# Patient Record
Sex: MALE | Race: BLACK OR AFRICAN AMERICAN | Employment: FULL TIME | URBAN - METROPOLITAN AREA
[De-identification: names, ages, dates, MRNs, and addresses within clinical notes are randomized per-mention and may not be internally consistent; named-entity substitution may affect disease eponyms.]

---

## 2018-05-24 ENCOUNTER — HOSPITAL ENCOUNTER (EMERGENCY)
Age: 28
Discharge: HOME | End: 2018-05-24
Attending: EMERGENCY MEDICINE

## 2018-05-24 VITALS
HEART RATE: 94 BPM | WEIGHT: 165 LBS | DIASTOLIC BLOOD PRESSURE: 76 MMHG | OXYGEN SATURATION: 97 % | TEMPERATURE: 98.2 F | RESPIRATION RATE: 16 BRPM | SYSTOLIC BLOOD PRESSURE: 115 MMHG | HEIGHT: 69 IN | BODY MASS INDEX: 24.44 KG/M2

## 2018-05-24 DIAGNOSIS — F10.920 ACUTE ALCOHOLIC INTOXICATION WITHOUT COMPLICATION (HCC): Primary | ICD-10-CM

## 2018-05-24 PROCEDURE — 99284 EMERGENCY DEPT VISIT MOD MDM: CPT

## 2021-05-29 ENCOUNTER — HOSPITAL ENCOUNTER (EMERGENCY)
Age: 31
Discharge: HOME | End: 2021-05-29
Attending: FAMILY MEDICINE

## 2021-05-29 VITALS
TEMPERATURE: 96.5 F | BODY MASS INDEX: 21.76 KG/M2 | DIASTOLIC BLOOD PRESSURE: 87 MMHG | HEIGHT: 75 IN | HEART RATE: 92 BPM | WEIGHT: 175 LBS | RESPIRATION RATE: 16 BRPM | OXYGEN SATURATION: 99 % | SYSTOLIC BLOOD PRESSURE: 132 MMHG

## 2021-05-29 DIAGNOSIS — J06.9 ACUTE UPPER RESPIRATORY INFECTION: Primary | ICD-10-CM

## 2021-05-29 LAB — SARS-COV-2, NAAT: NOT DETECTED

## 2021-05-29 PROCEDURE — 99283 EMERGENCY DEPT VISIT LOW MDM: CPT

## 2021-05-29 PROCEDURE — 87635 SARS-COV-2 COVID-19 AMP PRB: CPT

## 2021-05-29 RX ORDER — BROMPHENIRAMINE MALEATE, PSEUDOEPHEDRINE HYDROCHLORIDE, AND DEXTROMETHORPHAN HYDROBROMIDE 2; 30; 10 MG/5ML; MG/5ML; MG/5ML
5 SYRUP ORAL 4 TIMES DAILY PRN
Qty: 90 ML | Refills: 0 | Status: SHIPPED | OUTPATIENT
Start: 2021-05-29

## 2021-05-29 NOTE — ED PROVIDER NOTES
HPI:  5/29/21,   Time: 10:24 AM EDT         Coleman Perez is a 28 y.o. male presenting to the ED for a 3-day history of upper respiratory symptoms that include clear nasal discharge, postnasal drainage, nonproductive cough, achy muscles and feeling tired. He denies loss of taste or smell. He has been exposed to sick contacts at home, his children have been ill. ROS:   Pertinent positives and negatives are stated within HPI, all other systems reviewed and are negative.  --------------------------------------------- PAST HISTORY ---------------------------------------------  Past Medical History:  has no past medical history on file. Past Surgical History:  has no past surgical history on file. Social History:  reports that he has been smoking cigars. He has smoked for the past 5.00 years. He has never used smokeless tobacco. He reports current alcohol use. He reports current drug use. Drug: Marijuana. Family History: family history is not on file. The patients home medications have been reviewed. Allergies: Patient has no known allergies. -------------------------------------------------- RESULTS -------------------------------------------------  All laboratory and radiology results have been personally reviewed by myself   LABS:  No results found for this visit on 05/29/21. RADIOLOGY:  Interpreted by Radiologist.  No orders to display       ------------------------- NURSING NOTES AND VITALS REVIEWED ---------------------------   The nursing notes within the ED encounter and vital signs as below have been reviewed.    /87   Pulse 92   Temp 96.5 °F (35.8 °C) (Infrared)   Resp 16   Ht 6' 3\" (1.905 m)   Wt 175 lb (79.4 kg)   SpO2 99%   BMI 21.87 kg/m²   Oxygen Saturation Interpretation: Normal      ---------------------------------------------------PHYSICAL EXAM--------------------------------------    Constitutional/General: Alert and oriented x3, well appearing, non toxic in NAD  Head: NC/AT  Eyes: PERRL, EOMI  Mouth: Oropharynx clear, handling secretions, no trismus  Neck: Supple, full ROM, no meningeal signs  Pulmonary: Lungs clear to auscultation bilaterally, no wheezes, rales, or rhonchi. Not in respiratory distress  Cardiovascular:  Regular rate and rhythm, no murmurs, gallops, or rubs. 2+ distal pulses  Abdomen: Soft, non tender, non distended,   Extremities: Moves all extremities x 4. Warm and well perfused  Skin: warm and dry without rash  Neurologic: GCS 15,  Psych: Normal Affect      ------------------------------ ED COURSE/MEDICAL DECISION MAKING----------------------  Medications - No data to display      Medical Decision Making:    Simple    Counseling: The emergency provider has spoken with the patient and discussed todays results, in addition to providing specific details for the plan of care and counseling regarding the diagnosis and prognosis. Questions are answered at this time and they are agreeable with the plan.      --------------------------------- IMPRESSION AND DISPOSITION ---------------------------------    IMPRESSION  1.  Acute upper respiratory infection        DISPOSITION  Disposition: Discharge to home  Patient condition is stable                 Emerald Wilkes MD  05/29/21 [id number]

## 2021-05-29 NOTE — LETTER
[address] [facility] Emergency Department  [id number] [facility] [address]  Phone: [phone]               May 29, 2021    Patient: Willy Ko   YOB: 1990   Date of Visit: 5/29/2021       To Whom It May Concern:    Antonio Zamora was seen and treated in our emergency department on 5/29/2021. He may return to work on 05/31/2021.       Sincerely,       Cici Chauhan MD         Signature:__________________________________

## 2022-07-25 LAB
MEASLES IMMUNE (IGG): NORMAL
MUMPS AB IGG: NORMAL
RUBELLA ANTIBODY IGG: NORMAL
VARICELLA-ZOSTER VIRUS AB, IGG: NORMAL

## 2023-10-01 ENCOUNTER — HOSPITAL ENCOUNTER (EMERGENCY)
Age: 33
Discharge: HOME | End: 2023-10-02
Payer: COMMERCIAL

## 2023-10-01 DIAGNOSIS — S61.011A LACERATION OF RIGHT THUMB WITHOUT FOREIGN BODY WITHOUT DAMAGE TO NAIL, INITIAL ENCOUNTER: Primary | ICD-10-CM

## 2023-10-01 PROCEDURE — 99283 EMERGENCY DEPT VISIT LOW MDM: CPT

## 2023-10-01 PROCEDURE — 2500000003 HC RX 250 WO HCPCS: Performed by: NURSE PRACTITIONER

## 2023-10-01 PROCEDURE — 6370000000 HC RX 637 (ALT 250 FOR IP): Performed by: NURSE PRACTITIONER

## 2023-10-01 RX ORDER — BACITRACIN ZINC 500 [USP'U]/G
OINTMENT TOPICAL ONCE
Status: COMPLETED | OUTPATIENT
Start: 2023-10-01 | End: 2023-10-01

## 2023-10-01 RX ORDER — LIDOCAINE HYDROCHLORIDE 10 MG/ML
5 INJECTION, SOLUTION INFILTRATION; PERINEURAL ONCE
Status: COMPLETED | OUTPATIENT
Start: 2023-10-01 | End: 2023-10-01

## 2023-10-01 RX ADMIN — BACITRACIN ZINC: 500 OINTMENT TOPICAL at 23:23

## 2023-10-01 RX ADMIN — LIDOCAINE HYDROCHLORIDE 5 ML: 10 INJECTION, SOLUTION INFILTRATION; PERINEURAL at 23:24

## 2023-10-01 ASSESSMENT — LIFESTYLE VARIABLES
HOW MANY STANDARD DRINKS CONTAINING ALCOHOL DO YOU HAVE ON A TYPICAL DAY: 5 OR 6
HOW OFTEN DO YOU HAVE A DRINK CONTAINING ALCOHOL: 2-3 TIMES A WEEK

## 2023-10-01 ASSESSMENT — PAIN SCALES - GENERAL: PAINLEVEL_OUTOF10: 6

## 2023-10-02 ENCOUNTER — APPOINTMENT (OUTPATIENT)
Dept: GENERAL RADIOLOGY | Age: 33
End: 2023-10-02
Payer: COMMERCIAL

## 2023-10-02 VITALS
OXYGEN SATURATION: 98 % | TEMPERATURE: 97.3 F | RESPIRATION RATE: 16 BRPM | DIASTOLIC BLOOD PRESSURE: 92 MMHG | SYSTOLIC BLOOD PRESSURE: 134 MMHG | HEART RATE: 89 BPM

## 2023-10-02 PROCEDURE — 73140 X-RAY EXAM OF FINGER(S): CPT

## 2023-10-02 RX ORDER — CEPHALEXIN 500 MG/1
500 CAPSULE ORAL 4 TIMES DAILY
Qty: 28 CAPSULE | Refills: 0 | Status: SHIPPED | OUTPATIENT
Start: 2023-10-02 | End: 2023-10-09

## 2023-10-02 NOTE — DISCHARGE INSTRUCTIONS
Keep current dressing in place for the next 48 hours. On Tuesday you can change it. If the dressing is sticking just place it under warm water. You can then start dressing changes with bacitracin ointment. Take the antibiotic as prescribed. Take Motrin or Tylenol for additional pain relief. Follow-up with your primary care doctor within 1 week for wound recheck.

## 2025-02-04 ENCOUNTER — HOSPITAL ENCOUNTER (EMERGENCY)
Age: 35
Discharge: HOME | End: 2025-02-04
Payer: COMMERCIAL

## 2025-02-04 VITALS
BODY MASS INDEX: 24.87 KG/M2 | RESPIRATION RATE: 16 BRPM | DIASTOLIC BLOOD PRESSURE: 91 MMHG | OXYGEN SATURATION: 96 % | HEART RATE: 115 BPM | HEIGHT: 75 IN | TEMPERATURE: 99.2 F | WEIGHT: 200 LBS | SYSTOLIC BLOOD PRESSURE: 134 MMHG

## 2025-02-04 DIAGNOSIS — J10.1 INFLUENZA A: Primary | ICD-10-CM

## 2025-02-04 DIAGNOSIS — J06.9 VIRAL URI WITH COUGH: ICD-10-CM

## 2025-02-04 DIAGNOSIS — R52 BODY ACHES: ICD-10-CM

## 2025-02-04 LAB
INFLUENZA A BY PCR: DETECTED
INFLUENZA B BY PCR: NOT DETECTED
SARS-COV-2 RDRP RESP QL NAA+PROBE: NOT DETECTED
SPECIMEN DESCRIPTION: NORMAL

## 2025-02-04 PROCEDURE — 87635 SARS-COV-2 COVID-19 AMP PRB: CPT

## 2025-02-04 PROCEDURE — 99283 EMERGENCY DEPT VISIT LOW MDM: CPT

## 2025-02-04 PROCEDURE — 87502 INFLUENZA DNA AMP PROBE: CPT

## 2025-02-04 PROCEDURE — 6370000000 HC RX 637 (ALT 250 FOR IP): Performed by: PHYSICIAN ASSISTANT

## 2025-02-04 RX ORDER — ACETAMINOPHEN 325 MG/1
650 TABLET ORAL ONCE
Status: COMPLETED | OUTPATIENT
Start: 2025-02-04 | End: 2025-02-04

## 2025-02-04 RX ORDER — ACETAMINOPHEN 500 MG
500 TABLET ORAL EVERY 6 HOURS PRN
Qty: 30 TABLET | Refills: 0 | Status: SHIPPED | OUTPATIENT
Start: 2025-02-04

## 2025-02-04 RX ORDER — IBUPROFEN 800 MG/1
800 TABLET, FILM COATED ORAL ONCE
Status: COMPLETED | OUTPATIENT
Start: 2025-02-04 | End: 2025-02-04

## 2025-02-04 RX ORDER — BROMPHENIRAMINE MALEATE, PSEUDOEPHEDRINE HYDROCHLORIDE, AND DEXTROMETHORPHAN HYDROBROMIDE 2; 30; 10 MG/5ML; MG/5ML; MG/5ML
5 SYRUP ORAL 3 TIMES DAILY PRN
Qty: 250 ML | Refills: 0 | Status: SHIPPED | OUTPATIENT
Start: 2025-02-04

## 2025-02-04 RX ADMIN — ACETAMINOPHEN 650 MG: 325 TABLET ORAL at 13:34

## 2025-02-04 RX ADMIN — IBUPROFEN 800 MG: 800 TABLET, FILM COATED ORAL at 13:33

## 2025-02-04 ASSESSMENT — LIFESTYLE VARIABLES
HOW OFTEN DO YOU HAVE A DRINK CONTAINING ALCOHOL: NEVER
HOW MANY STANDARD DRINKS CONTAINING ALCOHOL DO YOU HAVE ON A TYPICAL DAY: PATIENT DOES NOT DRINK

## 2025-02-04 NOTE — ED PROVIDER NOTES
Independent [facility] Visit.     Department of Emergency Medicine   ED  Provider Note  Admit Date/RoomTime: 2/4/2025  1:41 PM  ED Room: WAITING RESULTS/WAITING *    Chief Complaint:   Cough (For the past two days), Chills, and Generalized Body Aches    History of Present Illness      Antonio Zamora is a 34 y.o. old male who presents to the ED for cough, congestion, fever, and bodyaches that has been ongoing for the past couple days.  Patient denies any abdominal pain, nausea, vomiting, or diarrhea.  He has no chest pain or shortness of breath.  Patient does not have a history of asthma.  He has not tried any over-the-counter medication.  He is accompanied by his daughter who is also being seen for the same complaints.  Patient is alert and oriented x 3 and in no apparent distress at this exam.  He is nontoxic-appearing.    PCP: No primary care provider on file.    ROS   Pertinent positives and negatives are stated within HPI, all other systems reviewed and are negative.    Past Medical History:  has no past medical history on file.    Past Surgical History:  has no past surgical history on file.    Social History:  reports that he has been smoking cigars. He has never used smokeless tobacco. He reports current alcohol use. He reports current drug use. Drug: Marijuana (Weed).    Family History: family history is not on file.     The patient’s home medications have been reviewed.    Allergies: Patient has no known allergies.  Allergies have been reviewed with patient.     Physical Exam   VS:  BP (!) 134/91   Pulse (!) 115   Temp 99.2 °F (37.3 °C) (Temporal)   Resp 16   Ht 1.905 m (6' 3\")   Wt 90.7 kg (200 lb)   SpO2 96%   BMI 25.00 kg/m²      Oxygen Saturation Interpretation: Normal.    Constitutional:  Alert and oriented x3, development consistent with age. NAD  Eyes: EOMI, non-injected conjunctiva   Ears:  External Ears: Bilateral normal.              TM's & External Canals:  normal TM's and external ear canals 
yes